# Patient Record
Sex: MALE | Race: WHITE | NOT HISPANIC OR LATINO
[De-identification: names, ages, dates, MRNs, and addresses within clinical notes are randomized per-mention and may not be internally consistent; named-entity substitution may affect disease eponyms.]

---
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
## 2024-02-06 NOTE — ED PEDIATRIC NURSE NOTE - PEDS FALL RISK ASSESSMENT TOOL OUTCOME
Alert and oriented to person, place and time. Normal mood and affect, no apparent risk to self or others
Low Risk (score 7-11)

## 2025-02-17 NOTE — ED PEDIATRIC NURSE NOTE - EXTENSIONS OF SELF_ADULT
Currently managed with Keppra 750mg daily.   - Follow-up with neurology    
Diagnosed with new onset atrial fibrillation/RVR in 6/2023 in setting of alcohol withdrawal, low magnesium, potassium, and sodium.    CHADSVASC Stroke Risk Points     Patient's CHADSVASC Score Total = 1    Patient's CHADSVASC Score Summary    Element Patient Score   Congestive Heart Failure 0   High blood pressure 1   Age 0    Diabetes 0   Stroke/TIA/Clot 0   Vascular disease 0   Sex 0     He is a poor candidate for oral anticoagulation.  -Currently managed off beta-blocker related to bradycardia  - Follow-up with electrophysiology, Dr. Mercado   
Most recent eGFR: >90 (1/22/2025)  - Medication regimen: Currently managed off beta-blockers due to bradycardia  - Adherence:   - Denies headache blurry vision, chest pain, palpitations, orthopnea, shortness of breathe, or leg swelling.   - Denies lightheadedness or dizziness with positional changes.     7/5/2023 Consult Dr. Mercado.  Losartan stopped for low BP    
With prior history of cocaine use, and significant alcohol use.  
None